# Patient Record
Sex: FEMALE | Race: WHITE | Employment: UNEMPLOYED | URBAN - METROPOLITAN AREA
[De-identification: names, ages, dates, MRNs, and addresses within clinical notes are randomized per-mention and may not be internally consistent; named-entity substitution may affect disease eponyms.]

---

## 2022-07-23 ENCOUNTER — HOSPITAL ENCOUNTER (EMERGENCY)
Age: 67
Discharge: PSYCHIATRIC OR SUBSTANCE USE FACILITY | DRG: 885 | End: 2022-07-24
Attending: EMERGENCY MEDICINE | Admitting: PSYCHIATRY & NEUROLOGY
Payer: MEDICARE

## 2022-07-23 DIAGNOSIS — F10.10 ALCOHOL ABUSE: ICD-10-CM

## 2022-07-23 DIAGNOSIS — T50.904A DRUG OVERDOSE OF UNDETERMINED INTENT, INITIAL ENCOUNTER: Primary | ICD-10-CM

## 2022-07-23 LAB
ALBUMIN SERPL-MCNC: 3.3 G/DL (ref 3.5–5)
ALBUMIN/GLOB SERPL: 0.7 {RATIO} (ref 1.1–2.2)
ALP SERPL-CCNC: 112 U/L (ref 45–117)
ALT SERPL-CCNC: 15 U/L (ref 12–78)
ANION GAP SERPL CALC-SCNC: 6 MMOL/L (ref 5–15)
APAP SERPL-MCNC: 6 UG/ML (ref 10–30)
AST SERPL-CCNC: 19 U/L (ref 15–37)
BASOPHILS # BLD: 0 K/UL (ref 0–0.1)
BASOPHILS NFR BLD: 0 % (ref 0–1)
BILIRUB SERPL-MCNC: 0.3 MG/DL (ref 0.2–1)
BUN SERPL-MCNC: 9 MG/DL (ref 6–20)
BUN/CREAT SERPL: 13 (ref 12–20)
CALCIUM SERPL-MCNC: 9.4 MG/DL (ref 8.5–10.1)
CHLORIDE SERPL-SCNC: 101 MMOL/L (ref 97–108)
CO2 SERPL-SCNC: 24 MMOL/L (ref 21–32)
CREAT SERPL-MCNC: 0.69 MG/DL (ref 0.55–1.02)
DIFFERENTIAL METHOD BLD: ABNORMAL
EOSINOPHIL # BLD: 0 K/UL (ref 0–0.4)
EOSINOPHIL NFR BLD: 0 % (ref 0–7)
ERYTHROCYTE [DISTWIDTH] IN BLOOD BY AUTOMATED COUNT: 15.6 % (ref 11.5–14.5)
ETHANOL SERPL-MCNC: <10 MG/DL
GLOBULIN SER CALC-MCNC: 4.6 G/DL (ref 2–4)
GLUCOSE SERPL-MCNC: 123 MG/DL (ref 65–100)
HCT VFR BLD AUTO: 32.3 % (ref 35–47)
HGB BLD-MCNC: 10.8 G/DL (ref 11.5–16)
IMM GRANULOCYTES # BLD AUTO: 0 K/UL (ref 0–0.04)
IMM GRANULOCYTES NFR BLD AUTO: 1 % (ref 0–0.5)
INR PPP: 1 (ref 0.9–1.1)
LYMPHOCYTES # BLD: 1 K/UL (ref 0.8–3.5)
LYMPHOCYTES NFR BLD: 15 % (ref 12–49)
MAGNESIUM SERPL-MCNC: 1.8 MG/DL (ref 1.6–2.4)
MCH RBC QN AUTO: 30.2 PG (ref 26–34)
MCHC RBC AUTO-ENTMCNC: 33.4 G/DL (ref 30–36.5)
MCV RBC AUTO: 90.2 FL (ref 80–99)
MONOCYTES # BLD: 1 K/UL (ref 0–1)
MONOCYTES NFR BLD: 15 % (ref 5–13)
NEUTS SEG # BLD: 4.9 K/UL (ref 1.8–8)
NEUTS SEG NFR BLD: 69 % (ref 32–75)
NRBC # BLD: 0 K/UL (ref 0–0.01)
NRBC BLD-RTO: 0 PER 100 WBC
PLATELET # BLD AUTO: 299 K/UL (ref 150–400)
PMV BLD AUTO: 9.6 FL (ref 8.9–12.9)
POTASSIUM SERPL-SCNC: 3.8 MMOL/L (ref 3.5–5.1)
PROT SERPL-MCNC: 7.9 G/DL (ref 6.4–8.2)
PROTHROMBIN TIME: 10.4 SEC (ref 9–11.1)
RBC # BLD AUTO: 3.58 M/UL (ref 3.8–5.2)
SALICYLATES SERPL-MCNC: <1.7 MG/DL (ref 2.8–20)
SODIUM SERPL-SCNC: 131 MMOL/L (ref 136–145)
WBC # BLD AUTO: 7 K/UL (ref 3.6–11)

## 2022-07-23 PROCEDURE — 80179 DRUG ASSAY SALICYLATE: CPT

## 2022-07-23 PROCEDURE — 96374 THER/PROPH/DIAG INJ IV PUSH: CPT

## 2022-07-23 PROCEDURE — 85610 PROTHROMBIN TIME: CPT

## 2022-07-23 PROCEDURE — 93005 ELECTROCARDIOGRAM TRACING: CPT

## 2022-07-23 PROCEDURE — 85025 COMPLETE CBC W/AUTO DIFF WBC: CPT

## 2022-07-23 PROCEDURE — 83735 ASSAY OF MAGNESIUM: CPT

## 2022-07-23 PROCEDURE — 80053 COMPREHEN METABOLIC PANEL: CPT

## 2022-07-23 PROCEDURE — 99285 EMERGENCY DEPT VISIT HI MDM: CPT

## 2022-07-23 PROCEDURE — 80143 DRUG ASSAY ACETAMINOPHEN: CPT

## 2022-07-23 PROCEDURE — 36415 COLL VENOUS BLD VENIPUNCTURE: CPT

## 2022-07-23 PROCEDURE — 74011250637 HC RX REV CODE- 250/637: Performed by: EMERGENCY MEDICINE

## 2022-07-23 PROCEDURE — 96361 HYDRATE IV INFUSION ADD-ON: CPT

## 2022-07-23 PROCEDURE — 82077 ASSAY SPEC XCP UR&BREATH IA: CPT

## 2022-07-23 RX ORDER — ACETAMINOPHEN 325 MG/1
650 TABLET ORAL
Status: COMPLETED | OUTPATIENT
Start: 2022-07-23 | End: 2022-07-23

## 2022-07-23 RX ADMIN — ACETAMINOPHEN 650 MG: 325 TABLET ORAL at 21:31

## 2022-07-24 ENCOUNTER — HOSPITAL ENCOUNTER (INPATIENT)
Age: 67
LOS: 2 days | Discharge: HOME | DRG: 881 | End: 2022-07-26
Attending: PSYCHIATRY & NEUROLOGY | Admitting: PSYCHIATRY & NEUROLOGY
Payer: MEDICARE

## 2022-07-24 VITALS
SYSTOLIC BLOOD PRESSURE: 134 MMHG | TEMPERATURE: 98 F | RESPIRATION RATE: 15 BRPM | BODY MASS INDEX: 18.78 KG/M2 | DIASTOLIC BLOOD PRESSURE: 86 MMHG | HEART RATE: 86 BPM | HEIGHT: 63 IN | WEIGHT: 106 LBS | OXYGEN SATURATION: 94 %

## 2022-07-24 DIAGNOSIS — F43.24 ADJUSTMENT DISORDER WITH DISTURBANCE OF CONDUCT: Primary | ICD-10-CM

## 2022-07-24 PROBLEM — F39 UNSPECIFIED MOOD (AFFECTIVE) DISORDER (HCC): Status: ACTIVE | Noted: 2022-07-24

## 2022-07-24 LAB
AMPHET UR QL SCN: NEGATIVE
ATRIAL RATE: 85 BPM
BARBITURATES UR QL SCN: POSITIVE
BENZODIAZ UR QL: POSITIVE
CALCULATED P AXIS, ECG09: 73 DEGREES
CALCULATED R AXIS, ECG10: 55 DEGREES
CALCULATED T AXIS, ECG11: 66 DEGREES
CANNABINOIDS UR QL SCN: POSITIVE
COCAINE UR QL SCN: NEGATIVE
DIAGNOSIS, 93000: NORMAL
DRUG SCRN COMMENT,DRGCM: ABNORMAL
FLUAV RNA SPEC QL NAA+PROBE: NOT DETECTED
FLUBV RNA SPEC QL NAA+PROBE: NOT DETECTED
METHADONE UR QL: NEGATIVE
OPIATES UR QL: NEGATIVE
P-R INTERVAL, ECG05: 150 MS
PCP UR QL: NEGATIVE
Q-T INTERVAL, ECG07: 378 MS
QRS DURATION, ECG06: 82 MS
QTC CALCULATION (BEZET), ECG08: 449 MS
SARS-COV-2, COV2: NOT DETECTED
UR CULT HOLD, URHOLD: NORMAL
VENTRICULAR RATE, ECG03: 85 BPM

## 2022-07-24 PROCEDURE — 74011250637 HC RX REV CODE- 250/637: Performed by: NURSE PRACTITIONER

## 2022-07-24 PROCEDURE — 74011250636 HC RX REV CODE- 250/636: Performed by: EMERGENCY MEDICINE

## 2022-07-24 PROCEDURE — 87636 SARSCOV2 & INF A&B AMP PRB: CPT

## 2022-07-24 PROCEDURE — 65220000003 HC RM SEMIPRIVATE PSYCH

## 2022-07-24 PROCEDURE — 65270000029 HC RM PRIVATE

## 2022-07-24 PROCEDURE — 80307 DRUG TEST PRSMV CHEM ANLYZR: CPT

## 2022-07-24 PROCEDURE — 74011250637 HC RX REV CODE- 250/637: Performed by: EMERGENCY MEDICINE

## 2022-07-24 RX ORDER — LIDOCAINE 50 MG/G
1 PATCH TOPICAL
COMMUNITY
Start: 2022-03-08

## 2022-07-24 RX ORDER — SODIUM BICARBONATE 650 MG/1
2 TABLET ORAL 2 TIMES DAILY
Status: ON HOLD | COMMUNITY
Start: 2022-04-27 | End: 2022-07-26 | Stop reason: SDUPTHER

## 2022-07-24 RX ORDER — BENZTROPINE MESYLATE 1 MG/1
0.5 TABLET ORAL
Status: DISCONTINUED | OUTPATIENT
Start: 2022-07-24 | End: 2022-07-26 | Stop reason: HOSPADM

## 2022-07-24 RX ORDER — ONDANSETRON 4 MG/1
4 TABLET, ORALLY DISINTEGRATING ORAL
Status: DISCONTINUED | OUTPATIENT
Start: 2022-07-24 | End: 2022-07-26 | Stop reason: HOSPADM

## 2022-07-24 RX ORDER — ONDANSETRON 2 MG/ML
4 INJECTION INTRAMUSCULAR; INTRAVENOUS ONCE
Status: COMPLETED | OUTPATIENT
Start: 2022-07-24 | End: 2022-07-24

## 2022-07-24 RX ORDER — HALOPERIDOL 5 MG/ML
2.5 INJECTION INTRAMUSCULAR
Status: DISCONTINUED | OUTPATIENT
Start: 2022-07-24 | End: 2022-07-26 | Stop reason: HOSPADM

## 2022-07-24 RX ORDER — UMECLIDINIUM BROMIDE AND VILANTEROL TRIFENATATE 62.5; 25 UG/1; UG/1
1 POWDER RESPIRATORY (INHALATION) DAILY
COMMUNITY

## 2022-07-24 RX ORDER — ADHESIVE BANDAGE
30 BANDAGE TOPICAL DAILY PRN
Status: DISCONTINUED | OUTPATIENT
Start: 2022-07-24 | End: 2022-07-26 | Stop reason: HOSPADM

## 2022-07-24 RX ORDER — CLOPIDOGREL BISULFATE 75 MG/1
75 TABLET ORAL DAILY
Status: ON HOLD | COMMUNITY
End: 2022-07-24 | Stop reason: SINTOL

## 2022-07-24 RX ORDER — ALBUTEROL SULFATE 90 UG/1
2 AEROSOL, METERED RESPIRATORY (INHALATION)
COMMUNITY

## 2022-07-24 RX ORDER — ATORVASTATIN CALCIUM 20 MG/1
20 TABLET, FILM COATED ORAL DAILY
Status: ON HOLD | COMMUNITY
End: 2022-07-26 | Stop reason: SDUPTHER

## 2022-07-24 RX ORDER — VALSARTAN 160 MG/1
1 TABLET ORAL DAILY
COMMUNITY
Start: 2022-07-23

## 2022-07-24 RX ORDER — ACETAMINOPHEN 325 MG/1
650 TABLET ORAL
Status: DISCONTINUED | OUTPATIENT
Start: 2022-07-24 | End: 2022-07-26 | Stop reason: HOSPADM

## 2022-07-24 RX ORDER — OLANZAPINE 2.5 MG/1
2.5 TABLET ORAL
Status: DISCONTINUED | OUTPATIENT
Start: 2022-07-24 | End: 2022-07-26 | Stop reason: HOSPADM

## 2022-07-24 RX ORDER — IBUPROFEN 200 MG
1 TABLET ORAL DAILY
Status: DISCONTINUED | OUTPATIENT
Start: 2022-07-25 | End: 2022-07-26 | Stop reason: HOSPADM

## 2022-07-24 RX ORDER — CARISOPRODOL 350 MG/1
1 TABLET ORAL
COMMUNITY
Start: 2022-05-16

## 2022-07-24 RX ORDER — DIPHENHYDRAMINE HYDROCHLORIDE 50 MG/ML
25 INJECTION, SOLUTION INTRAMUSCULAR; INTRAVENOUS
Status: DISCONTINUED | OUTPATIENT
Start: 2022-07-24 | End: 2022-07-26 | Stop reason: HOSPADM

## 2022-07-24 RX ORDER — ACETAMINOPHEN 325 MG/1
650 TABLET ORAL
Status: COMPLETED | OUTPATIENT
Start: 2022-07-24 | End: 2022-07-24

## 2022-07-24 RX ADMIN — ACETAMINOPHEN 650 MG: 325 TABLET ORAL at 15:06

## 2022-07-24 RX ADMIN — ACETAMINOPHEN 650 MG: 325 TABLET ORAL at 02:26

## 2022-07-24 RX ADMIN — ONDANSETRON 4 MG: 2 INJECTION INTRAMUSCULAR; INTRAVENOUS at 01:10

## 2022-07-24 RX ADMIN — ONDANSETRON 4 MG: 4 TABLET, ORALLY DISINTEGRATING ORAL at 15:37

## 2022-07-24 RX ADMIN — SODIUM CHLORIDE 1000 ML: 900 INJECTION, SOLUTION INTRAVENOUS at 01:28

## 2022-07-24 NOTE — PROGRESS NOTES
Pt is a new admission to the [facility], alert and oriented x4. Brought in on stretcher, ambulates with some unsteadiness. Calm and cooperative during admission process. States she's here post overdose on valsartan after being discharged from [facility] to go to the healing place. Currently denies SI/HI/AVH. Reports \"some\" depression. Reports previous alcohol use \"about two weeks ago\". Reports history of falls due to \"being drunk\". Endorses history of hypertension. States sleep and appetite are good. Lives in a one story apartment, alone, with sister as a support system. Denies any further needs at present. Skin assessment/change out performed by Briseida Maxwell and Nisreen Guerrero RN. Provided snack and shown to bedroom. Unit orientation provided. Orders received, placed on visual checks. Will continue to monitor .     Problem: Depressed Mood (Adult/Pediatric)  Goal: *STG: Remains safe in hospital  Outcome: Progressing Towards Goal

## 2022-07-24 NOTE — ED PROVIDER NOTES
56-year-old female presents from [facility] with reported overdose of valsartan. Patient was discharged earlier today from [facility] where she states she was being treated for alcohol withdrawal as well as crack cocaine withdrawal.  She filled the prescription for valsartan today and was dispensed 90 tablets. She states she swallowed approximately half the bottle. On account appears at 36 tablets were missing each 160 mg. Patient denies any vomiting. She is complaining of a headache and some abdominal pain. She has no lightheaded or dizziness. She cannot state why she swallowed the pills she states she is she is just in a fog and she did not know what she was doing. Past Medical History:   Diagnosis Date    Hx of transient ischemic attack (TIA)        No past surgical history on file. No family history on file. Social History     Socioeconomic History    Marital status: SINGLE     Spouse name: Not on file    Number of children: Not on file    Years of education: Not on file    Highest education level: Not on file   Occupational History    Not on file   Tobacco Use    Smoking status: Not on file    Smokeless tobacco: Not on file   Substance and Sexual Activity    Alcohol use: Not on file    Drug use: Not on file    Sexual activity: Not on file   Other Topics Concern    Not on file   Social History Narrative    Not on file     Social Determinants of Health     Financial Resource Strain: Not on file   Food Insecurity: Not on file   Transportation Needs: Not on file   Physical Activity: Not on file   Stress: Not on file   Social Connections: Not on file   Intimate Partner Violence: Not on file   Housing Stability: Not on file         ALLERGIES: Aspirin, Codeine, Fish containing products, Nsaids (non-steroidal anti-inflammatory drug), and Pcn [penicillins]    Review of Systems   Constitutional:  Negative for fever. HENT:  Negative for facial swelling.     Eyes:  Negative for visual disturbance. Respiratory:  Negative for chest tightness. Cardiovascular:  Negative for chest pain. Gastrointestinal:  Negative for abdominal pain. Genitourinary:  Negative for difficulty urinating and dysuria. Musculoskeletal:  Negative for arthralgias. Skin:  Negative for rash. Neurological:  Negative for headaches. Hematological:  Negative for adenopathy. Psychiatric/Behavioral:  Negative for suicidal ideas. Vitals:    07/24/22 0329 07/24/22 0429 07/24/22 0455 07/24/22 0830   BP: 138/72 138/76 116/76 134/86   Pulse: 87 82 84 86   Resp: 27 25 25 15   Temp:       SpO2: 94% 96% 95% 94%   Weight:       Height:                Physical Exam  Vitals and nursing note reviewed. Constitutional:       General: She is not in acute distress. Appearance: She is well-developed. HENT:      Head: Normocephalic and atraumatic. Eyes:      General: No scleral icterus. Conjunctiva/sclera: Conjunctivae normal.      Pupils: Pupils are equal, round, and reactive to light. Cardiovascular:      Rate and Rhythm: Normal rate. Heart sounds: No murmur heard. Pulmonary:      Effort: Pulmonary effort is normal. No respiratory distress. Abdominal:      General: There is no distension. Musculoskeletal:         General: Normal range of motion. Cervical back: Normal range of motion and neck supple. Skin:     General: Skin is warm and dry. Findings: No rash. Neurological:      Mental Status: She is alert and oriented to person, place, and time. MDM  Number of Diagnoses or Management Options  Alcohol abuse  Drug overdose of undetermined intent, initial encounter  Diagnosis management comments: Assessment: I spoke to poison control. They recommended 6 hours of observation during which we should be checking for hypotension as well as electrolyte disturbances.   If she does develop hypotension they recommended her to liter IV fluid bolus and if that did not help, starting vasopressors. If she remains relatively asymptomatic without serious lab abnormalities after 6 hours, she can be medically cleared. We will have Taylor Palmer evaluate her at that time. Amount and/or Complexity of Data Reviewed  Clinical lab tests: reviewed  Tests in the medicine section of CPT®: reviewed      ED Course as of 07/26/22 1918   Sat Jul 23, 2022 2019 EKG, 12 LEAD, INITIAL  ED EKG interpretation:  Rhythm: normal sinus rhythm. Rate (approx.): 85. Axis: normal.  ST segment:  No concerning ST elevations or depressions. This EKG was interpreted by Alethea Breaux MD,ED Provider. [JM]   8559 10:08 PM  Change of shift. Care of patient taken over from Dr. Freddie Bower; H&P reviewed, bedside handoff complete. Awaiting plan for observation until 0200 provided no complications then evaluate by [facility]  [UT]   Sun Jul 24, 2022   0109 Discussed case with BSMART. Patient is a candidate for psychiatric admission. Not medically clear. Will speak with poison control for further recs at 0200 [UT]      ED Course User Index  [JM] Sam Darnell MD  [UT] Aleida Mccoy MD     9:50 PM  Change of shift. Care of patient signed over to Dr. Joey Rod. Bedside handoff complete. Awaiting completion of observation period then [facility]es consult.      Procedures

## 2022-07-24 NOTE — ED NOTES
Patient placed in green gown, belongings secured outside of room. Security at bedside to wand patient. Ligature risks removed from room. Cardiac monitoring is still in place at this time d/t patient ingestion of medications PTA. Suicide precautions explained to patient who verbalized understanding. Charge RN notified of need for safety sitter.

## 2022-07-24 NOTE — BSMART NOTE
Patient accepted to [facility] to General Unit- Rm320 Bed 2; by NP, Alison Peña on the behalf of Dr. Wali Ray.     Nurse to Nurse- [phone]
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
## 2022-07-24 NOTE — BSMART NOTE
BSMART evaluation, rec inpatient due to patient reporting her recent ingestion of medication was an attempt to kill herself. Consulted with ED provider, Dr. Twan Fitzpatrick stating patient is not yet medically cleared and she will contact BSMART after medically clearance. Discussed patient's sodium level being low and stating Vicente, with bed access, was inquiring if patient's sodium level will be addressed. Dr. Hilda Duong said she will address patient's medical needs per poison control's recommendations. BSMART assessment completed, and suicide risk level noted to be high. Primary Nurse Samm Newell and Physician Dr. Twan Fitzpatrick notified. Concerns observed by Patient's belongings are in patient's room across the room from her at this time. Security/Off- has not been notified.

## 2022-07-24 NOTE — ED NOTES
Phone call from Poison control to review labs and VS.   Per PC, patient is \"cleared from toxicology standpoint\" but still needs to be evaluated by [facility] for intention of ingestion. BSMART notified by Ulis Carissa at this time.

## 2022-07-24 NOTE — PROGRESS NOTES
[facility] Admission Pharmacy Medication Reconciliation    Information obtained from:  Paola Wilkins [place] , patient interview  RxQuery data available1:yes    Comments/recommendations:    1) The patient was interviewed regarding current PTA medication list, use and drug allergies. Patient stated she hasn't taken atorvastatin or sodium bicarbonate for 2 months because she ran out 2 months ago because no refills left and needed to see doctor. Patient stated that clopidogrel was stopped by MD due to bruising. 2) Medication changes to PTA list:    Added  Valsartan 160 mg po daily (filled 7/23/22 #90)  Carisoprodol 350 mg po BID prn muscle spasms (filled 5/16/22 #60)  Albuterol inhaler  Removed  Clopidogrel 75 mg - patient stated MD stopped due to bruising  NOTES  Atorvastatin 20 mg po daily - ran out 2 months ago because no refills left and needed to see doctor  Sodium bicarbonate 650 mg po 2 tablets BID - ran out 2 months ago because no refills left and needed to see doctor    3) The [place] Prescription Monitoring Program () was accessed to determine fill history of any controlled medications:  Recurring monthly since 9/2020: carisoprodol 350 mg #60/30-day supply (last filled 5/16/22 #60)  Recurring monthly since 7/2021: gabapentin 100 mg - 400 mg #90/30-day supply (last filled 400 mg 5/5/2022 #90)  5/12/22 lorazepam 1 mg tab  #10/2-day supply  11/11/21 alprazolam 0.5 mg tab #10/5-day supply  4/12/22 & 2/11/21 chlordiazepoxide 25 mg #25/6-day supply  8/2020 to 11/2020 tramadol 50 mg #30/30-day supply       1RxQuery pharmacy benefit data reflects medications filled and processed through the patient's insurance, however                this data does NOT capture whether the medication was picked up or is currently being taken by the patient.      Time spent: 30 minutes    Past Medical History/Disease States:  Past Medical History:   Diagnosis Date    Hx of transient ischemic attack (TIA)          Patient allergies: Allergies as of 2022 - Fully Reviewed 2022   Allergen Reaction Noted    Aspirin Other (comments) 2022    Codeine Other (comments) 2022    Fish containing products Other (comments) 2022    Nsaids (non-steroidal anti-inflammatory drug) Other (comments) 2022    Pcn [penicillins] Other (comments) 2022         Prior to Admission Medications   Prescriptions Last Dose Informant  Taking? albuterol (PROVENTIL HFA, VENTOLIN HFA, PROAIR HFA) 90 mcg/actuation inhaler 2022   Yes   Sig: Take 2 Puffs by inhalation every four (4) hours as needed for Wheezing or Shortness of Breath. atorvastatin (LIPITOR) 20 mg tablet 2022   No   Sig: Take 20 mg by mouth in the morning. carisoprodoL (SOMA) 350 mg tablet 2022   Yes   Sig: Take 1 Tablet by mouth two (2) times daily as needed. lidocaine (LIDODERM) 5 %    Yes   Si Patch by TransDERmal route daily as needed. 12 hours on then 12 hours off   sodium bicarbonate 650 mg tablet 2022   No   Sig: Take 2 Tablets by mouth two (2) times a day. umeclidinium-vilanteroL (Anoro Ellipta) 62.5-25 mcg/actuation inhaler 2022   Yes   Sig: Take 1 Puff by inhalation daily. valsartan (DIOVAN) 160 mg tablet 2022   Yes   Sig: Take 1 Tablet by mouth in the morning.             Thank you,  Chandni Hall, [facility]

## 2022-07-24 NOTE — ED TRIAGE NOTES
TRIAGE NOTE:   Patient arrives by EMS from the [facility] with c/o overdosing on \"45\" Valsartan pills around 1700 today. Patient states she was not trying to harm herself, states \"I think its just the fog of alcohol withdrawal, I don't even remember taking them\". Patient just discharged from [facility] this morning for alcohol withdrawal.       Patient arrives with bottle of Valsartan 160 mg 90 tablets that was filled today. On count 36 out of 90 pills missing. Patient alert and oriented on arrival patient reports pain 7/10 to head and abdomen.

## 2022-07-25 PROBLEM — F32.A DEPRESSION: Status: ACTIVE | Noted: 2022-07-25

## 2022-07-25 PROBLEM — F43.24 ADJUSTMENT DISORDER WITH DISTURBANCE OF CONDUCT: Status: ACTIVE | Noted: 2022-07-25

## 2022-07-25 PROCEDURE — 99223 1ST HOSP IP/OBS HIGH 75: CPT | Performed by: PSYCHIATRY & NEUROLOGY

## 2022-07-25 PROCEDURE — 74011250637 HC RX REV CODE- 250/637: Performed by: NURSE PRACTITIONER

## 2022-07-25 PROCEDURE — 74011250637 HC RX REV CODE- 250/637

## 2022-07-25 PROCEDURE — 65220000003 HC RM SEMIPRIVATE PSYCH

## 2022-07-25 PROCEDURE — 74011250637 HC RX REV CODE- 250/637: Performed by: PSYCHIATRY & NEUROLOGY

## 2022-07-25 RX ORDER — TOPIRAMATE 25 MG/1
25 TABLET ORAL 2 TIMES DAILY WITH MEALS
Status: DISCONTINUED | OUTPATIENT
Start: 2022-07-25 | End: 2022-07-26 | Stop reason: HOSPADM

## 2022-07-25 RX ORDER — TIZANIDINE 4 MG/1
2 TABLET ORAL
Status: DISCONTINUED | OUTPATIENT
Start: 2022-07-25 | End: 2022-07-26 | Stop reason: HOSPADM

## 2022-07-25 RX ORDER — HYDROXYZINE 25 MG/1
25 TABLET, FILM COATED ORAL
Status: DISCONTINUED | OUTPATIENT
Start: 2022-07-25 | End: 2022-07-26 | Stop reason: HOSPADM

## 2022-07-25 RX ORDER — TOPIRAMATE 25 MG/1
25 TABLET ORAL 2 TIMES DAILY WITH MEALS
Status: DISCONTINUED | OUTPATIENT
Start: 2022-07-25 | End: 2022-07-25

## 2022-07-25 RX ORDER — ATORVASTATIN CALCIUM 10 MG/1
20 TABLET, FILM COATED ORAL DAILY
Status: DISCONTINUED | OUTPATIENT
Start: 2022-07-25 | End: 2022-07-26 | Stop reason: HOSPADM

## 2022-07-25 RX ORDER — VALSARTAN 40 MG/1
160 TABLET ORAL DAILY
Status: DISCONTINUED | OUTPATIENT
Start: 2022-07-25 | End: 2022-07-26 | Stop reason: HOSPADM

## 2022-07-25 RX ORDER — ALBUTEROL SULFATE 90 UG/1
2 AEROSOL, METERED RESPIRATORY (INHALATION)
Status: DISCONTINUED | OUTPATIENT
Start: 2022-07-25 | End: 2022-07-26 | Stop reason: HOSPADM

## 2022-07-25 RX ORDER — CARISOPRODOL 350 MG/1
350 TABLET ORAL
Status: DISCONTINUED | OUTPATIENT
Start: 2022-07-25 | End: 2022-07-25 | Stop reason: CLARIF

## 2022-07-25 RX ORDER — LANOLIN ALCOHOL/MO/W.PET/CERES
10.5 CREAM (GRAM) TOPICAL
Status: DISCONTINUED | OUTPATIENT
Start: 2022-07-25 | End: 2022-07-26 | Stop reason: HOSPADM

## 2022-07-25 RX ADMIN — ONDANSETRON 4 MG: 4 TABLET, ORALLY DISINTEGRATING ORAL at 08:27

## 2022-07-25 RX ADMIN — MELATONIN TAB 3 MG 10.5 MG: 3 TAB at 21:25

## 2022-07-25 RX ADMIN — HYDROXYZINE HYDROCHLORIDE 25 MG: 25 TABLET ORAL at 21:24

## 2022-07-25 RX ADMIN — ONDANSETRON 4 MG: 4 TABLET, ORALLY DISINTEGRATING ORAL at 13:03

## 2022-07-25 RX ADMIN — TOPIRAMATE 25 MG: 25 TABLET, FILM COATED ORAL at 10:18

## 2022-07-25 RX ADMIN — ATORVASTATIN CALCIUM 20 MG: 10 TABLET, FILM COATED ORAL at 10:17

## 2022-07-25 RX ADMIN — TOPIRAMATE 25 MG: 25 TABLET, FILM COATED ORAL at 17:28

## 2022-07-25 RX ADMIN — ONDANSETRON 4 MG: 4 TABLET, ORALLY DISINTEGRATING ORAL at 00:50

## 2022-07-25 RX ADMIN — ACETAMINOPHEN 650 MG: 325 TABLET ORAL at 17:28

## 2022-07-25 NOTE — PROGRESS NOTES
Spiritual Care Assessment/Progress Note  [facility]      NAME: Tresa Hightower      MRN: [id number]  AGE: 74 y.o. SEX: female  Adventism Affiliation: No preference   Language: English     7/25/2022     Total Time (in minutes): 5     Spiritual Assessment begun in [place] [id number] through conversation with:         []Patient        [] Family    [] Friend(s)        Reason for Consult: Initial/Spiritual assessment, patient floor     Spiritual beliefs: (Please include comment if needed)     [] Identifies with a ebony tradition:         [] Supported by a ebony community:            [] Claims no spiritual orientation:           [] Seeking spiritual identity:                [] Adheres to an individual form of spirituality:           [x] Not able to assess:                           Identified resources for coping:      [] Prayer                               [] Music                  [] Guided Imagery     [] Family/friends                 [] Pet visits     [] Devotional reading                         [x] Unknown     [] Other:                                               Interventions offered during this visit: (See comments for more details)                Plan of Care:     [] Support spiritual and/or cultural needs    [] Support AMD and/or advance care planning process      [] Support grieving process   [] Coordinate Rites and/or Rituals    [] Coordination with community clergy   [] No spiritual needs identified at this time   [] Detailed Plan of Care below (See Comments)  [] Make referral to Music Therapy  [] Make referral to Pet Therapy     [] Make referral to Addiction services  [] Make referral to [facility]  [] Make referral to Spiritual Care Partner  [] No future visits requested        [x] Contact Spiritual Care for further referrals     Comments:  visit for initial spiritual assessment. Patient out of room at the time of this visit.   Please contact spiritual care for further referral or consult. Rev.  Curt Robles MDiv, [facility], [facility]   paging service: [phone])

## 2022-07-25 NOTE — PROGRESS NOTES
Bedside and Verbal shift change report given to Cheng Lopez RN (oncoming nurse) by Steven Wooten RN (offgoing nurse). Report included the following information SBAR, Kardex, and MAR. Patient received in bed alert and oriented X 3, mood is flat and depressed, speech is clear, gait is unsteady. Patient denied SI, HI, AVH, pain, anxiety and depression. Patient reports nausea and excessive salivation. Emesis bag provided. As needed Zofran administered. 0600: Patient observed asleep for 8.5 hours with even respiration. Nursing rounds maintained. Problem: Falls - Risk of  Goal: *Absence of Falls  Description: Document Danne Lobe Fall Risk and appropriate interventions in the flowsheet.   Outcome: Progressing Towards Goal  Note: Fall Risk Interventions:  Mobility Interventions: Utilize walker, cane, or other assistive device       Problem: Anxiety-Behavioral Health (Adult/Pediatric)  Goal: *STG: Seeks staff when feelings of anxiety and fear arise  Outcome: Progressing Towards Goal

## 2022-07-25 NOTE — BH NOTES
Behavioral Health Interdisciplinary Rounds    Patient goal(s) for today: Attend groups, rest, complete ADL's, communicate needs to staff  Treatment team focus/goals: Discuss reason for admission  Progress note: Pt met with treatment team for the first time since admission. Pt reports that she was recently discharged from [facility] for detoxing from alcohol and [facility], which she did not want to attend. Pt got to [facility] and attempted to overdose on blood pressure medications. Pt reports she is feeling better and would like to be restarted on anxiety medications, pt is requesting Librium, MD to start on Topamax. Plan to discharge tomorrow to [facility] with follow up with PCP, possible referral to [address] [place] [address]. Financial concerns/prescription coverage: Medicare Part A&B [Pt Access to Confirm and report specific Medicare plan] and [facility] Medicaid [#[id number]] - Voluntary  Family contact:  Aparna Olivier, sister [phone]                   Family requesting physician contact today:  No  Discharge plan: TBD  Access to weapons : None                                                          Outpatient provider(s): TBD  Patient's preferred phone number for follow up call : [phone]  Patient's preferred e-mail address :    LOS:  1  Expected LOS: 2    Participating treatment team members: John Barrios MSW, Dr. Gaetano Leon

## 2022-07-25 NOTE — PROGRESS NOTES
[facility] Dosing Services: Therapeutic Interchange    Carisoprodol was therapeutically interchanged for tizanidine per the P&T Committee approved Therapeutic Interchanges Policy.     Thank you,     Michelle Ritchie, PharmD   Contact: [phone]

## 2022-07-25 NOTE — PROGRESS NOTES
Pt screened per LOS policy. Admitted to [facility], no acute nutrition issues identified. BMI c/w underweight for pt's age. Supplements ordered for pt's meal trays.   Ht: 5'3\"  Wt: 106 lb  BMI:  18.78 kg/(m^2) c/w underweight  Est energy needs: 1755 kcal, 62 g protein, 1 mL/kcal fluids  Pt will consume > 75% of meals at follow up 7-10 days  LOS, BMI

## 2022-07-25 NOTE — DISCHARGE INSTRUCTIONS
DISCHARGE SUMMARY    NAME:Tameka Wang  : 1955  MRN: [id number]    The patient Merissa Little exhibits the ability to control behavior in a less restrictive environment. Patient's level of functioning is improving. No assaultive/destructive behavior has been observed for the past 24 hours. No suicidal/homicidal threat or behavior has been observed for the past 24 hours. There is no evidence of serious medication side effects. Patient has not been in physical or protective restraints for at least the past 24 hours. If weapons involved, how are they secured? None    Is patient aware of and in agreement with discharge plan? Yes    Arrangements for medication:  Prescriptions sent to pharmacy    Copy of discharge instructions to provider?:  Yes    Arrangements for transportation home: Medicaid Cab scheduled 1330    Keep all follow up appointments as scheduled, continue to take prescribed medications per physician instructions.   Mental health crisis number:  262 or your local mental health crisis line number at [facility] at [phone] Detox  [phone] ()      Mental Health Emergency WARM LINE      2-693-522-MHAV ([phone])      M-F: 9am to 9pm      Sat & Sun: 5pm - 9pm  National suicide prevention lines:                             1-357-SPZOAMO (4-239-402-727-689-8958)       1-749-855-TALK (1-307-204-791.617.3784)    Crisis Text Line:  Text HOME to 267674

## 2022-07-25 NOTE — BH NOTES
PSYCHOSOCIAL ASSESSMENT  :Patient identifying info:   Tresa Hightower is a 74 y.o., female admitted 7/24/2022 10:17 AM     Presenting problem and precipitating factors: 74year old female admitted from [address] [place] [address] ED after an attempted suicide by overdosing on blood pressure medications. Pt was recently discharged from [address] [place] [address] for detox program and was sent to [facility] but she did not agree with this plan and felt trapped and attempted to overdose. Pt has hx of alcohol and cocaine use. Pt denies HI and [facility]. Mental status assessment: Pt is pleasant and forthcoming when meeting with treatment team. Pt appears slightly lethargic but is able to appropriately respond to assessment questions. Pt appears AOx4 and displays a clear, linear thought pattern. Strengths/Recreation/Coping Skills: Insured, stable housing, steady income    Collateral information: Bertin Doss, , [phone]; Ainsley Cooks, [phone]    Current psychiatric /substance abuse providers and contact info: Dr. Gavin Tse, Dr. Michelle Myers    Previous psychiatric/substance abuse providers and response to treatment: Hx of [facility] hospitalizations, discharged from [address] [place] [address] recently    Family history of mental illness or substance abuse: None stated    Substance abuse history:  Hx of alcohol and cocaine, UDS+ Barbiturates, Benzodiazepines, and THC, BAL 0  Social History     Tobacco Use    Smoking status: Not on file    Smokeless tobacco: Not on file   Substance Use Topics    Alcohol use: Not on file       History of biomedical complications associated with substance abuse: None stated    Patient's current acceptance of treatment or motivation for change: Voluntary admission    Family constellation: Pt is single without children    Is significant other involved?  No    Describe support system: Has  and support from sister    Describe living arrangements and home environment: Lives independently    GUARDIAN/POA: NO    Guardian Name: NO    Guardian Contact: NO    Health issues:   Hospital Problems  Never Reviewed            Codes Class Noted POA    Depression ICD-10-CM: F31. A  ICD-9-CM: 073  2022 Unknown           Trauma history: None stated    Legal issues: No pending legal charges    History of  service: No    Financial status: SSI    Yazidi/cultural factors: None stated    Education/work history: Achieved high school level of education    Have you been licensed as a health care professional (current or ): No    Describe coping skills: Limited, ineffective    TANJA Tolliver  2022

## 2022-07-25 NOTE — PROGRESS NOTES
Behavioral Services  Medicare Certification Upon Admission    I certify that this patient's inpatient psychiatric hospital admission is medically necessary for:    [x] (1) Treatment which could reasonably be expected to improve this patient's condition,       [x] (2) Or for diagnostic study;     AND     [x](2) The inpatient psychiatric services are provided while the individual is under the care of a physician and are included in the individualized plan of care.     Estimated length of stay/service 5-7 days    Plan for post-hospital care home    Electronically signed by Taisha Aviles MD on 7/25/2022 at 8:18 AM

## 2022-07-25 NOTE — PROGRESS NOTES
Patient received resting in her room. Denies SI/HI/AVH and depression, endorses an anxiety level of 7. Calm, cooperative, pleasant, isolative to her room throughout the shift. Taking medications as prescribed. Will continue to monitor for safety.

## 2022-07-25 NOTE — PROGRESS NOTES
Problem: Depressed Mood (Adult/Pediatric)  Goal: *STG: Participates in treatment plan  Outcome: Progressing Towards Goal  Goal: *STG: Remains safe in hospital  Outcome: Progressing Towards Goal  Goal: *STG: Complies with medication therapy  Outcome: Progressing Towards Goal     Problem: Anxiety-Behavioral Health (Adult/Pediatric)  Goal: *STG: Seeks staff when feelings of anxiety and fear arise  Outcome: Progressing Towards Goal     Problem: Falls - Risk of  Goal: *Absence of Falls  Description: Document Ernesto Fall Risk and appropriate interventions in the flowsheet.   Outcome: Progressing Towards Goal  Note: Fall Risk Interventions:    Mobility Interventions: Utilize walker, cane, or other assistive device

## 2022-07-26 VITALS
BODY MASS INDEX: 18.78 KG/M2 | RESPIRATION RATE: 16 BRPM | HEART RATE: 97 BPM | DIASTOLIC BLOOD PRESSURE: 69 MMHG | WEIGHT: 106 LBS | HEIGHT: 63 IN | SYSTOLIC BLOOD PRESSURE: 116 MMHG | TEMPERATURE: 98.2 F | OXYGEN SATURATION: 100 %

## 2022-07-26 PROCEDURE — 74011250637 HC RX REV CODE- 250/637: Performed by: PSYCHIATRY & NEUROLOGY

## 2022-07-26 PROCEDURE — 99239 HOSP IP/OBS DSCHRG MGMT >30: CPT | Performed by: PSYCHIATRY & NEUROLOGY

## 2022-07-26 PROCEDURE — 74011250637 HC RX REV CODE- 250/637: Performed by: NURSE PRACTITIONER

## 2022-07-26 RX ORDER — ONDANSETRON 4 MG/1
4 TABLET, ORALLY DISINTEGRATING ORAL
Qty: 60 TABLET | Refills: 1 | Status: SHIPPED | OUTPATIENT
Start: 2022-07-26

## 2022-07-26 RX ORDER — ATORVASTATIN CALCIUM 20 MG/1
20 TABLET, FILM COATED ORAL DAILY
Qty: 30 TABLET | Refills: 1 | Status: SHIPPED | OUTPATIENT
Start: 2022-07-26

## 2022-07-26 RX ORDER — ACETAMINOPHEN, DIPHENHYDRAMINE HCL, PHENYLEPHRINE HCL 325; 25; 5 MG/1; MG/1; MG/1
10.5 TABLET ORAL
Qty: 30 TABLET | Refills: 1 | Status: SHIPPED | OUTPATIENT
Start: 2022-07-26

## 2022-07-26 RX ORDER — SODIUM BICARBONATE 650 MG/1
1300 TABLET ORAL 2 TIMES DAILY
Qty: 120 TABLET | Refills: 1 | Status: SHIPPED | OUTPATIENT
Start: 2022-07-26

## 2022-07-26 RX ORDER — HYDROXYZINE 25 MG/1
25 TABLET, FILM COATED ORAL
Qty: 60 TABLET | Refills: 1 | Status: SHIPPED | OUTPATIENT
Start: 2022-07-26 | End: 2022-09-24

## 2022-07-26 RX ORDER — TOPIRAMATE 25 MG/1
25 TABLET ORAL 2 TIMES DAILY WITH MEALS
Qty: 60 TABLET | Refills: 1 | Status: SHIPPED | OUTPATIENT
Start: 2022-07-26

## 2022-07-26 RX ORDER — TIZANIDINE 4 MG/1
4 TABLET ORAL
Qty: 60 TABLET | Refills: 1 | Status: CANCELLED | OUTPATIENT
Start: 2022-07-26

## 2022-07-26 RX ADMIN — ONDANSETRON 4 MG: 4 TABLET, ORALLY DISINTEGRATING ORAL at 07:46

## 2022-07-26 RX ADMIN — ATORVASTATIN CALCIUM 20 MG: 10 TABLET, FILM COATED ORAL at 09:08

## 2022-07-26 RX ADMIN — TOPIRAMATE 25 MG: 25 TABLET, FILM COATED ORAL at 09:08

## 2022-07-26 NOTE — PROGRESS NOTES
Pharmacist Discharge Medication Reconciliation    Discharge Provider:  Nasrin 3      Discharge Medications:      My Medications        START taking these medications        Instructions Each Dose to Equal Morning Noon Evening Bedtime   hydrOXYzine HCL 25 mg tablet  Commonly known as: ATARAX    Your last dose was: Your next dose is: Take 1 Tablet by mouth two (2) times daily as needed for Anxiety for up to 60 days. Indications: anxious   25 mg                 melatonin 10 mg Tab    Your last dose was: Your next dose is: Take 10.5 mg by mouth nightly as needed for Insomnia. Indications: Insomnia   10.5 mg                 ondansetron 4 mg disintegrating tablet  Commonly known as: ZOFRAN ODT    Your last dose was: Your next dose is: Take 1 Tablet by mouth two (2) times daily as needed for Nausea or Vomiting. Indications: Nausea or vomiting   4 mg                 topiramate 25 mg tablet  Commonly known as: TOPAMAX    Your last dose was: Your next dose is: Take 1 Tablet by mouth two (2) times daily (with meals). Indications: cocaine abuse   25 mg                        CONTINUE taking these medications        Instructions Each Dose to Equal Morning Noon Evening Bedtime   albuterol 90 mcg/actuation inhaler  Commonly known as: PROVENTIL HFA, VENTOLIN HFA, PROAIR HFA    Your last dose was: Your next dose is: Take 2 Puffs by inhalation every four (4) hours as needed for Wheezing or Shortness of Breath. 2 Puff                 Anoro Ellipta 62.5-25 mcg/actuation inhaler  Generic drug: umeclidinium-vilanteroL    Your last dose was: Your next dose is: Take 1 Puff by inhalation daily. 1 Puff                 atorvastatin 20 mg tablet  Commonly known as: LIPITOR    Your last dose was: Your next dose is: Take 1 Tablet by mouth in the morning.  Indications: high amount of triglyceride in the blood   20 mg                 carisoprodoL 350 mg tablet  Commonly known as: SOMA    Your last dose was: Your next dose is: Take 1 Tablet by mouth two (2) times daily as needed. 1 Tablet                 lidocaine 5 %  Commonly known as: LIDODERM    Your last dose was: Your next dose is:         1 Patch by TransDERmal route daily as needed. 12 hours on then 12 hours off   1 Patch                 sodium bicarbonate 650 mg tablet    Your last dose was: Your next dose is: Take 2 Tablets by mouth two (2) times a day. Indications: Hyponatremia   1,300 mg                 valsartan 160 mg tablet  Commonly known as: DIOVAN    Your last dose was: Your next dose is: Take 1 Tablet by mouth in the morning.    1 Tablet                        STOP taking these medications      clopidogreL 75 mg Tab  Commonly known as: PLAVIX                  Where to Get Your Medications        These medications were sent to [facility], [address]  [address], [address] [place] [address]      Phone: [phone]   atorvastatin 20 mg tablet  hydrOXYzine HCL 25 mg tablet  melatonin 10 mg Tab  ondansetron 4 mg disintegrating tablet  sodium bicarbonate 650 mg tablet  topiramate 25 mg tablet       The patient's chart, MAR, and AVS were reviewed by   DAYRON Valencia,   Contact: [phone]

## 2022-07-26 NOTE — BH NOTES
Assumed care of the patient. Patient was mostly isolative to her room and appeared somewhat anxious. She was cooperative with the assessments. She denied S.I/H. I/A/V/H and confirmed little anxiety and depression. She said, anxiety is related to \"Going home tomorrow. \" PRN Trazodone for sleep and Atarax for anxiety administered upon request. Will continue to monitor and provide support as needed. Patient appeared to be sleeping for about 8 hours.

## 2022-07-26 NOTE — H&P
INITIAL PSYCHIATRIC EVALUATION            IDENTIFICATION:    Patient Name  Virgil Sandoval   Date of Birth 1955   [facility] [id number]   Medical Record Number  [id number]      Age  74 y.o. PCP None   Admit date:  7/24/2022    Room Number  321/01  @ [facility]   Date of Service  7/25/2022            HISTORY         REASON FOR HOSPITALIZATION:  CC: \"suicide attempt\". Pt admitted under a voluntary basis for suicidal ideations proving to be an imminent danger to self and an inability to care for self. HISTORY OF PRESENT ILLNESS:    The patient, Virgil Sandoval, is a 74 y.o. WHITE/NON- female with a past psychiatric history significant for cocaine use disorder, who presents at this time with complaints of (and/or evidence of) the following emotional symptoms: suicidal thoughts/threats and suicide attempt Overdose of: HTN medication (Valsartan) approximately 2 days ago. Additional symptomatology include increased mood lability. The above symptoms have been present for 48+ hours. These symptoms are of moderate to high severity. These symptoms are intermittent/ fleeting in nature. The patient's condition has been precipitated by psychosocial stressors. Patient's condition made worse by treatment noncompliance. UDS: +benzodiazepines, THC, barbiturates; BAL=0. The patient was send to ED following intentional overdose of her HTN medication because she did not want to attend rehab (she had just been detoxed off cocaine at an OSH). The patient is a fair historian. The patient corroborates the above narrative. The patient contracts for safety on the unit and gives consent for the team to contact collateral. The patient is amenable to initiating treatment while on the unit. The patient is agreeable to start and agent to address cocaine cravings, she does not wish to attend rehab and would prefer returning home. She denies active thoughts of self harm and is otherwise in fair behavioral control. ALLERGIES:   Allergies   Allergen Reactions    Aspirin Other (comments)    Codeine Other (comments)    Fish Containing Products Other (comments)    Nsaids (Non-Steroidal Anti-Inflammatory Drug) Other (comments)    Pcn [Penicillins] Other (comments)      MEDICATIONS PRIOR TO ADMISSION:   Medications Prior to Admission   Medication Sig    valsartan (DIOVAN) 160 mg tablet Take 1 Tablet by mouth in the morning. umeclidinium-vilanteroL (Anoro Ellipta) 62.5-25 mcg/actuation inhaler Take 1 Puff by inhalation daily. carisoprodoL (SOMA) 350 mg tablet Take 1 Tablet by mouth two (2) times daily as needed. albuterol (PROVENTIL HFA, VENTOLIN HFA, PROAIR HFA) 90 mcg/actuation inhaler Take 2 Puffs by inhalation every four (4) hours as needed for Wheezing or Shortness of Breath.    lidocaine (LIDODERM) 5 % 1 Patch by TransDERmal route daily as needed. 12 hours on then 12 hours off    atorvastatin (LIPITOR) 20 mg tablet Take 20 mg by mouth in the morning.    sodium bicarbonate 650 mg tablet Take 2 Tablets by mouth two (2) times a day. PAST MEDICAL HISTORY:   Past Medical History:   Diagnosis Date    Hx of transient ischemic attack (TIA)    No past surgical history on file. SOCIAL HISTORY:  The patient is currently retired; the patient is not a smoker; the patient's marital status is single; the patient does not have children; the patient reports the highest level of education achieved is high school. She lives alone. FAMILY HISTORY: History reviewed, pertinent family history as below:   No family history on file. REVIEW OF SYSTEMS:   Pertinent items are noted in the History of Present Illness. All other Systems reviewed and are considered negative.            MENTAL STATUS EXAM & VITALS     MENTAL STATUS EXAM (MSE):    MSE FINDINGS ARE WITHIN NORMAL LIMITS (WNL) UNLESS OTHERWISE STATED BELOW. ( ALL OF THE BELOW CATEGORIES OF THE MSE HAVE BEEN REVIEWED (reviewed 7/25/2022) AND UPDATED AS DEEMED APPROPRIATE )  General Presentation older than stated age, cooperative and guarded   Orientation oriented to time, place and person   Vital Signs  See below (reviewed 7/25/2022); Vital Signs (BP, Pulse, & Temp) are within normal limits if not listed below. Gait and Station Stable/steady, no ataxia   Musculoskeletal System No extrapyramidal symptoms (EPS); no abnormal muscular movements or Tardive Dyskinesia (TD); muscle strength and tone are within normal limits   Language No aphasia or dysarthria   Speech:  normal volume and non-pressured   Thought Processes logical; normal rate of thoughts; fair abstract reasoning/computation   Thought Associations goal directed   Thought Content preoccupations   Suicidal Ideations contracts for safety   Homicidal Ideations none   Mood:  depressed   Affect:  constricted and mood-congruent   Memory recent  intact   Memory remote:  intact   Concentration/Attention:  intact   Fund of Knowledge average   Insight:  limited   Reliability fair   Judgment:  poor          VITALS:     No data found.   Wt Readings from Last 3 Encounters:   07/24/22 48.1 kg (106 lb)   07/24/22 48.1 kg (106 lb)     Temp Readings from Last 3 Encounters:   07/24/22 97.9 °F (36.6 °C)   07/24/22 98 °F (36.7 °C)     BP Readings from Last 3 Encounters:   07/24/22 (!) 153/94   07/24/22 134/86     Pulse Readings from Last 3 Encounters:   07/24/22 100   07/24/22 86            DATA     LABORATORY DATA:  Labs Reviewed - No data to display  Admission on 07/23/2022, Discharged on 07/24/2022   Component Date Value Ref Range Status    Ventricular Rate 07/23/2022 85  BPM Final    Atrial Rate 07/23/2022 85  BPM Final    P-R Interval 07/23/2022 150  ms Final    QRS Duration 07/23/2022 82  ms Final    Q-T Interval 07/23/2022 378  ms Final    QTC Calculation (Bezet) 07/23/2022 449  ms Final    Calculated P Axis 07/23/2022 73  degrees Final    Calculated R Axis 07/23/2022 55  degrees Final    Calculated T Axis 07/23/2022 66  degrees Final Diagnosis 07/23/2022    Final                    Value:Normal sinus rhythm  Normal ECG  No previous ECGs available  Confirmed by Alyson Villanueva ([id number]) on 7/24/2022 11:35:33 AM      WBC 07/23/2022 7.0  3.6 - 11.0 K/uL Final    RBC 07/23/2022 3.58 (A) 3.80 - 5.20 M/uL Final    HGB 07/23/2022 10.8 (A) 11.5 - 16.0 g/dL Final    HCT 07/23/2022 32.3 (A) 35.0 - 47.0 % Final    MCV 07/23/2022 90.2  80.0 - 99.0 FL Final    MCH 07/23/2022 30.2  26.0 - 34.0 PG Final    MCHC 07/23/2022 33.4  30.0 - 36.5 g/dL Final    RDW 07/23/2022 15.6 (A) 11.5 - 14.5 % Final    PLATELET 88/65/9409 820  150 - 400 K/uL Final    MPV 07/23/2022 9.6  8.9 - 12.9 FL Final    NRBC 07/23/2022 0.0  0  WBC Final    ABSOLUTE NRBC 07/23/2022 0.00  0.00 - 0.01 K/uL Final    NEUTROPHILS 07/23/2022 69  32 - 75 % Final    LYMPHOCYTES 07/23/2022 15  12 - 49 % Final    MONOCYTES 07/23/2022 15 (A) 5 - 13 % Final    EOSINOPHILS 07/23/2022 0  0 - 7 % Final    BASOPHILS 07/23/2022 0  0 - 1 % Final    IMMATURE GRANULOCYTES 07/23/2022 1 (A) 0.0 - 0.5 % Final    ABS. NEUTROPHILS 07/23/2022 4.9  1.8 - 8.0 K/UL Final    ABS. LYMPHOCYTES 07/23/2022 1.0  0.8 - 3.5 K/UL Final    ABS. MONOCYTES 07/23/2022 1.0  0.0 - 1.0 K/UL Final    ABS. EOSINOPHILS 07/23/2022 0.0  0.0 - 0.4 K/UL Final    ABS. BASOPHILS 07/23/2022 0.0  0.0 - 0.1 K/UL Final    ABS. IMM.  GRANS. 07/23/2022 0.0  0.00 - 0.04 K/UL Final    DF 07/23/2022 AUTOMATED    Final    INR 07/23/2022 1.0  0.9 - 1.1   Final    Prothrombin time 07/23/2022 10.4  9.0 - 11.1 sec Final    Sodium 07/23/2022 131 (A) 136 - 145 mmol/L Final    Potassium 07/23/2022 3.8  3.5 - 5.1 mmol/L Final    Chloride 07/23/2022 101  97 - 108 mmol/L Final    CO2 07/23/2022 24  21 - 32 mmol/L Final    Anion gap 07/23/2022 6  5 - 15 mmol/L Final    Glucose 07/23/2022 123 (A) 65 - 100 mg/dL Final    BUN 07/23/2022 9  6 - 20 MG/DL Final    Creatinine 07/23/2022 0.69  0.55 - 1.02 MG/DL Final    BUN/Creatinine ratio 07/23/2022 13  12 - 20   Final GFR est AA 07/23/2022 >60  >60 ml/min/1.73m2 Final    GFR est non-AA 07/23/2022 >60  >60 ml/min/1.73m2 Final    Calcium 07/23/2022 9.4  8.5 - 10.1 MG/DL Final    Bilirubin, total 07/23/2022 0.3  0.2 - 1.0 MG/DL Final    ALT (SGPT) 07/23/2022 15  12 - 78 U/L Final    AST (SGOT) 07/23/2022 19  15 - 37 U/L Final    Alk. phosphatase 07/23/2022 112  45 - 117 U/L Final    Protein, total 07/23/2022 7.9  6.4 - 8.2 g/dL Final    Albumin 07/23/2022 3.3 (A) 3.5 - 5.0 g/dL Final    Globulin 07/23/2022 4.6 (A) 2.0 - 4.0 g/dL Final    A-G Ratio 07/23/2022 0.7 (A) 1.1 - 2.2   Final    Magnesium 07/23/2022 1.8  1.6 - 2.4 mg/dL Final    Salicylate level 21/79/5395 <1.7 (A) 2.8 - 20.0 MG/DL Final    Acetaminophen level 07/23/2022 6 (A) 10 - 30 ug/mL Final    ALCOHOL(ETHYL),SERUM 07/23/2022 <10  <10 MG/DL Final    AMPHETAMINES 07/24/2022 Negative  NEG   Final    BARBITURATES 07/24/2022 Positive (A) NEG   Final    BENZODIAZEPINES 07/24/2022 Positive (A) NEG   Final    COCAINE 07/24/2022 Negative  NEG   Final    METHADONE 07/24/2022 Negative  NEG   Final    OPIATES 07/24/2022 Negative  NEG   Final    PCP(PHENCYCLIDINE) 07/24/2022 Negative  NEG   Final    THC (TH-CANNABINOL) 07/24/2022 Positive (A) NEG   Final    Drug screen comment 07/24/2022 (NOTE)   Final    Urine culture hold 07/24/2022 Urine on hold in Microbiology dept for 2 days. If unpreserved urine is submitted, it cannot be used for addtional testing after 24 hours, recollection will be required. Final    SARS-CoV-2 by PCR 07/24/2022 Not detected  NOTD   Final    Influenza A by PCR 07/24/2022 Not detected  NOTD   Final    Influenza B by PCR 07/24/2022 Not detected  NOTD   Final        RADIOLOGY REPORTS:  No results found for this or any previous visit. No results found.            MEDICATIONS       ALL MEDICATIONS  Current Facility-Administered Medications   Medication Dose Route Frequency    albuterol (PROVENTIL HFA, VENTOLIN HFA, PROAIR HFA) inhaler 2 Puff  2 Puff Inhalation Q4H PRN    atorvastatin (LIPITOR) tablet 20 mg  20 mg Oral DAILY    valsartan (DIOVAN) tablet 160 mg  160 mg Oral DAILY    tiotropium-olodateroL (STIOLTO RESPIMAT) 2.5-2.5 mcg/actuation inhaler 2 Puff  2 Puff Inhalation DAILY    topiramate (TOPAMAX) tablet 25 mg  25 mg Oral BID WITH MEALS    tiZANidine (ZANAFLEX) tablet 2 mg  2 mg Oral TID PRN    hydrOXYzine HCL (ATARAX) tablet 25 mg  25 mg Oral TID PRN    melatonin tablet 10.5 mg  10.5 mg Oral QHS PRN    OLANZapine (ZyPREXA) tablet 2.5 mg  2.5 mg Oral Q6H PRN    haloperidol lactate (HALDOL) injection 2.5 mg  2.5 mg IntraMUSCular Q6H PRN    benztropine (COGENTIN) tablet 0.5 mg  0.5 mg Oral BID PRN    diphenhydrAMINE (BENADRYL) injection 25 mg  25 mg IntraMUSCular BID PRN    acetaminophen (TYLENOL) tablet 650 mg  650 mg Oral Q4H PRN    magnesium hydroxide (MILK OF MAGNESIA) 400 mg/5 mL oral suspension 30 mL  30 mL Oral DAILY PRN    nicotine (NICODERM CQ) 21 mg/24 hr patch 1 Patch  1 Patch TransDERmal DAILY    ondansetron (ZOFRAN ODT) tablet 4 mg  4 mg Oral Q6H PRN      SCHEDULED MEDICATIONS  Current Facility-Administered Medications   Medication Dose Route Frequency    atorvastatin (LIPITOR) tablet 20 mg  20 mg Oral DAILY    valsartan (DIOVAN) tablet 160 mg  160 mg Oral DAILY    tiotropium-olodateroL (STIOLTO RESPIMAT) 2.5-2.5 mcg/actuation inhaler 2 Puff  2 Puff Inhalation DAILY    topiramate (TOPAMAX) tablet 25 mg  25 mg Oral BID WITH MEALS    nicotine (NICODERM CQ) 21 mg/24 hr patch 1 Patch  1 Patch TransDERmal DAILY                ASSESSMENT & PLAN        The patient, Merissa Little, is a 74 y.o.  female who presents at this time for treatment of the following diagnoses:  Patient Active Hospital Problem List:  Adjustment disorder with disturbance of conduct (7/25/2022)   Assessment: the patients presents following a suicidal gesture in the setting of psychosocial stressors (being asked to attend rehab by her family and treatment team).  She is pleasant and cooperative but acknowledges fairly poor coping skills and limited frustration tolerance. Patient is agreeable to start an agent to lower the likelihood of cocaine relapse. She does not wish to attend rehab and is therefore a poor candidate. Plan:   - START Topamax 25 mg BIDAC for cocaine use disorder  - Observe off substances  - IGM therapy as tolerated  - Expand database / obtain collateral  - Dispo planning (home when stable)         A coordinated, multidisplinary treatment team (includes the nurse, unit pharmacist,  and writer) round was conducted for this initial evaluation with the patient present. The following regarding medications was addressed during rounds with patient: the risks and benefits of the proposed medication. The patient was given the opportunity to ask questions. Informed consent given to the use of the above medications. I will continue to adjust psychiatric and non-psychiatric medications (see above \"medication\" section and orders section for details) as deemed appropriate & based upon diagnoses and response to treatment. I have reviewed admission (and previous/old) labs and medical tests in the EHR and or transferring hospital documents. I will continue to order blood tests/labs and diagnostic tests as deemed appropriate and review results as they become available (see orders for details). I have reviewed old psychiatric and medical records available in the EHR. I Will order additional psychiatric records from other institutions to further elucidate the nature of patient's psychopathology and review once available. I will gather additional collateral information from friends, family and o/p treatment team to further elucidate the nature of patient's psychopathology and baselline level of psychiatric functioning.     I certify that this patient's inpatient psychiatric hospital services are required for treatment that could reasonably be expected to improve the patient's condition, or for diagnostic study, and that the patient continues to need, on a daily basis, active treatment furnished directly by or requiring the supervision of inpatient psychiatric facility personnel. In addition, the hospital records show that services furnished were intensive treatment services, admission or related services, or equivalent services.       ESTIMATED LENGTH OF STAY:  2-3 days       STRENGTHS:  Exercising self-direction/Resourceful, Access to housing/residential stability, and Awareness of Substance abuse issues                                        SIGNED:    Lissett Brown MD  7/25/2022

## 2022-07-26 NOTE — BH NOTES
Behavioral Health Interdisciplinary Rounds    Patient goal(s) for today: Attend groups, rest, complete ADL's, communicate needs to staff  Treatment team focus/goals: Discuss disposition planning  Progress note: Pt met with treatment team and appeared with fair mood and affect. Pt appeared more alert than previous day but reports she slept poorly last night due to discomfort from the mattress. Pt reports she is looking forward to going back to [facility],  provided resources for AA support groups in her area as well as registration information for local CSB. Pt demonstrates fair insight into supports needed to improve mental health and continue with sobriety. Pt denies SI/HI and WOODS AT [facility] and affirms plans to stay away from substance use triggers outside of the hospital. Pt reports that her sister is a major trigger and feels she needs distance. Pt to have transportation to [facility] with Medicaid cab at 1330. Financial concerns/prescription coverage: Medicare Part A&B [Pt Access to Confirm and report specific Medicare plan] and [facility] [#[id number]] - Voluntary  Family contact:  Jennifer Morel, sister [phone]                   Family requesting physician contact today:  No  Discharge plan: TBD  Access to weapons : None                                                          Outpatient provider(s): TBD  Patient's preferred phone number for follow up call : [phone]  Patient's preferred e-mail address :    LOS:  2  Expected LOS: 2    Participating treatment team members: Josiah Esqueda, Tamara Gomez, MSW, Dr. Hunter Bae

## 2022-07-26 NOTE — PROGRESS NOTES
Patient received resting in her room. Denies SI/HI/AVH, depression and anxiety. Calm, cooperative, isolative to her room throughout the shift. Taking medications as prescribed. Will continue to monitor for safety. 1410:  Patient walked down to meet a medicaid cab. All belongings returned. Discharge instructions reviewed with patient.

## 2022-07-26 NOTE — BH NOTES
Behavioral Health Transition Record to Provider    Patient Name: Mirella Alex  YOB: 1955  Medical Record Number: [id number]  Date of Admission: 7/24/2022  Date of Discharge: 7/26/22    Attending Provider: Carlos Waterman, *  Discharging Provider: Dr. Werner Lantigua  To contact this individual call [phone] and ask the  to page. If unavailable, ask to be transferred to [facility] Provider on call. [facility] Provider will be available on call 24/7 and during holidays. Primary Care Provider: None    Allergies   Allergen Reactions    Aspirin Other (comments)    Codeine Other (comments)    Fish Containing Products Other (comments)    Nsaids (Non-Steroidal Anti-Inflammatory Drug) Other (comments)    Pcn [Penicillins] Other (comments)       Reason for Admission:   REASON FOR HOSPITALIZATION:  CC: \"suicide attempt\". Pt admitted under a voluntary basis for suicidal ideations proving to be an imminent danger to self and an inability to care for self. HISTORY OF PRESENT ILLNESS:    The patient, Mirella Alex, is a 74 y.o. WHITE/NON- female with a past psychiatric history significant for cocaine use disorder, who presents at this time with complaints of (and/or evidence of) the following emotional symptoms: suicidal thoughts/threats and suicide attempt Overdose of: HTN medication (Valsartan) approximately 2 days ago. Additional symptomatology include increased mood lability. The above symptoms have been present for 48+ hours. These symptoms are of moderate to high severity. These symptoms are intermittent/ fleeting in nature. The patient's condition has been precipitated by psychosocial stressors. Patient's condition made worse by treatment noncompliance. UDS: +benzodiazepines, THC, barbiturates; BAL=0.      The patient was send to ED following intentional overdose of her HTN medication because she did not want to attend rehab (she had just been detoxed off cocaine at an OSH). The patient is a fair historian. The patient corroborates the above narrative. The patient contracts for safety on the unit and gives consent for the team to contact collateral. The patient is amenable to initiating treatment while on the unit. The patient is agreeable to start and agent to address cocaine cravings, she does not wish to attend rehab and would prefer returning home. She denies active thoughts of self harm and is otherwise in fair behavioral control. Admission Diagnosis: Depression [F32. A]    * No surgery found *    Results for orders placed or performed during the hospital encounter of 07/23/22   URINE CULTURE HOLD SAMPLE    Specimen: Serum   Result Value Ref Range    Urine culture hold        Urine on hold in Microbiology dept for 2 days. If unpreserved urine is submitted, it cannot be used for addtional testing after 24 hours, recollection will be required. COVID-19 WITH INFLUENZA A/B   Result Value Ref Range    SARS-CoV-2 by PCR Not detected NOTD      Influenza A by PCR Not detected NOTD      Influenza B by PCR Not detected NOTD     CBC WITH AUTOMATED DIFF   Result Value Ref Range    WBC 7.0 3.6 - 11.0 K/uL    RBC 3.58 (L) 3.80 - 5.20 M/uL    HGB 10.8 (L) 11.5 - 16.0 g/dL    HCT 32.3 (L) 35.0 - 47.0 %    MCV 90.2 80.0 - 99.0 FL    MCH 30.2 26.0 - 34.0 PG    MCHC 33.4 30.0 - 36.5 g/dL    RDW 15.6 (H) 11.5 - 14.5 %    PLATELET 046 269 - 434 K/uL    MPV 9.6 8.9 - 12.9 FL    NRBC 0.0 0  WBC    ABSOLUTE NRBC 0.00 0.00 - 0.01 K/uL    NEUTROPHILS 69 32 - 75 %    LYMPHOCYTES 15 12 - 49 %    MONOCYTES 15 (H) 5 - 13 %    EOSINOPHILS 0 0 - 7 %    BASOPHILS 0 0 - 1 %    IMMATURE GRANULOCYTES 1 (H) 0.0 - 0.5 %    ABS. NEUTROPHILS 4.9 1.8 - 8.0 K/UL    ABS. LYMPHOCYTES 1.0 0.8 - 3.5 K/UL    ABS. MONOCYTES 1.0 0.0 - 1.0 K/UL    ABS. EOSINOPHILS 0.0 0.0 - 0.4 K/UL    ABS. BASOPHILS 0.0 0.0 - 0.1 K/UL    ABS. IMM.  GRANS. 0.0 0.00 - 0.04 K/UL    DF AUTOMATED     PROTHROMBIN TIME + INR Result Value Ref Range    INR 1.0 0.9 - 1.1      Prothrombin time 10.4 9.0 - 21.7 sec   METABOLIC PANEL, COMPREHENSIVE   Result Value Ref Range    Sodium 131 (L) 136 - 145 mmol/L    Potassium 3.8 3.5 - 5.1 mmol/L    Chloride 101 97 - 108 mmol/L    CO2 24 21 - 32 mmol/L    Anion gap 6 5 - 15 mmol/L    Glucose 123 (H) 65 - 100 mg/dL    BUN 9 6 - 20 MG/DL    Creatinine 0.69 0.55 - 1.02 MG/DL    BUN/Creatinine ratio 13 12 - 20      GFR est AA >60 >60 ml/min/1.73m2    GFR est non-AA >60 >60 ml/min/1.73m2    Calcium 9.4 8.5 - 10.1 MG/DL    Bilirubin, total 0.3 0.2 - 1.0 MG/DL    ALT (SGPT) 15 12 - 78 U/L    AST (SGOT) 19 15 - 37 U/L    Alk.  phosphatase 112 45 - 117 U/L    Protein, total 7.9 6.4 - 8.2 g/dL    Albumin 3.3 (L) 3.5 - 5.0 g/dL    Globulin 4.6 (H) 2.0 - 4.0 g/dL    A-G Ratio 0.7 (L) 1.1 - 2.2     MAGNESIUM   Result Value Ref Range    Magnesium 1.8 1.6 - 2.4 mg/dL   SALICYLATE   Result Value Ref Range    Salicylate level <9.9 (L) 2.8 - 20.0 MG/DL   ACETAMINOPHEN   Result Value Ref Range    Acetaminophen level 6 (L) 10 - 30 ug/mL   ETHYL ALCOHOL   Result Value Ref Range    ALCOHOL(ETHYL),SERUM <10 <10 MG/DL   DRUG SCREEN, URINE   Result Value Ref Range    AMPHETAMINES Negative NEG      BARBITURATES Positive (A) NEG      BENZODIAZEPINES Positive (A) NEG      COCAINE Negative NEG      METHADONE Negative NEG      OPIATES Negative NEG      PCP(PHENCYCLIDINE) Negative NEG      THC (TH-CANNABINOL) Positive (A) NEG      Drug screen comment (NOTE)    EKG, 12 LEAD, INITIAL   Result Value Ref Range    Ventricular Rate 85 BPM    Atrial Rate 85 BPM    P-R Interval 150 ms    QRS Duration 82 ms    Q-T Interval 378 ms    QTC Calculation (Bezet) 449 ms    Calculated P Axis 73 degrees    Calculated R Axis 55 degrees    Calculated T Axis 66 degrees    Diagnosis       Normal sinus rhythm  Normal ECG  No previous ECGs available  Confirmed by Faina Roy ([id number]) on 7/24/2022 11:35:33 AM         Immunizations administered during this encounter:   Immunization History   Administered Date(s) Administered    COVID-19, J&J, (age 18y+), IM, 0.5mL 08/11/2021       Screening for Metabolic Disorders for Patients on Antipsychotic Medications  (Data obtained from the EMR)    Estimated Body Mass Index  Estimated body mass index is 18.78 kg/m² as calculated from the following:    Height as of this encounter: 5' 3\" (1.6 m). Weight as of this encounter: 48.1 kg (106 lb). Vital Signs/Blood Pressure  Visit Vitals  BP (!) 161/85   Pulse 98   Temp 98.4 °F (36.9 °C)   Resp 16   Ht 5' 3\" (1.6 m)   Wt 48.1 kg (106 lb)   SpO2 98%   BMI 18.78 kg/m²       Blood Glucose/Hemoglobin A1c  Lab Results   Component Value Date/Time    Glucose 123 (H) 07/23/2022 08:24 PM       No results found for: HBA1C, [id number]     Lipid Panel  No results found for: CHOL, CHOLX, CHLST, CHOLV, 021464, HDL, HDLP, LDL, LDLC, DLDLP, TGLX, TRIGL, TRIGP, CHHD, CHHDX     Discharge Diagnosis: Adjustment disorder with disturbance of conduct    Discharge Plan: The patient Yang Barney exhibits the ability to control behavior in a less restrictive environment. Patient's level of functioning is improving. No assaultive/destructive behavior has been observed for the past 24 hours. No suicidal/homicidal threat or behavior has been observed for the past 24 hours. There is no evidence of serious medication side effects. Patient has not been in physical or protective restraints for at least the past 24 hours. If weapons involved, how are they secured? None    Is patient aware of and in agreement with discharge plan? Yes    Arrangements for medication:  Prescriptions sent to pharmacy    Copy of discharge instructions to provider?:  Yes    Arrangements for transportation home: [employer] Cab scheduled 1330    Keep all follow up appointments as scheduled, continue to take prescribed medications per physician instructions.   Mental health crisis number:  [id number] or your local mental health crisis line number at [employer] at [phone] Detox - [phone] (24/7)    Discharge Medication List and Instructions:   Discharge Medication List as of 7/26/2022  1:08 PM        START taking these medications    Details   hydrOXYzine HCL (ATARAX) 25 mg tablet Take 1 Tablet by mouth two (2) times daily as needed for Anxiety for up to 60 days. Indications: anxious, Normal, Disp-60 Tablet, R-1      melatonin 10 mg tab Take 10.5 mg by mouth nightly as needed for Insomnia. Indications: Insomnia, Normal, Disp-30 Tablet, R-1      ondansetron (ZOFRAN ODT) 4 mg disintegrating tablet Take 1 Tablet by mouth two (2) times daily as needed for Nausea or Vomiting. Indications: Nausea or vomiting, Normal, Disp-60 Tablet, R-1      topiramate (TOPAMAX) 25 mg tablet Take 1 Tablet by mouth two (2) times daily (with meals). Indications: cocaine abuse, Normal, Disp-60 Tablet, R-1           CONTINUE these medications which have CHANGED    Details   atorvastatin (LIPITOR) 20 mg tablet Take 1 Tablet by mouth in the morning. Indications: high amount of triglyceride in the blood, Normal, Disp-30 Tablet, R-1      sodium bicarbonate 650 mg tablet Take 2 Tablets by mouth two (2) times a day. Indications: Hyponatremia, Normal, Disp-120 Tablet, R-1           CONTINUE these medications which have NOT CHANGED    Details   valsartan (DIOVAN) 160 mg tablet Take 1 Tablet by mouth in the morning., Historical Med      umeclidinium-vilanteroL (Anoro Ellipta) 62.5-25 mcg/actuation inhaler Take 1 Puff by inhalation daily. , Historical Med      carisoprodoL (SOMA) 350 mg tablet Take 1 Tablet by mouth two (2) times daily as needed., Historical Med      albuterol (PROVENTIL HFA, VENTOLIN HFA, PROAIR HFA) 90 mcg/actuation inhaler Take 2 Puffs by inhalation every four (4) hours as needed for Wheezing or Shortness of Breath., Historical Med      lidocaine (LIDODERM) 5 % 1 Patch by TransDERmal route daily as needed.  12 hours on then 12 hours off, Historical Med           STOP taking these medications       clopidogreL (PLAVIX) 75 mg tab Comments:   Reason for Stopping:               Unresulted Labs (24h ago, onward)      None          To obtain results of studies pending at discharge, please contact [phone]    Follow-up Information       Follow up With Specialties Details Why Contact Info    Dr. Dominic Sepulveda an appointment as soon as possible for a visit in 3 day(s) Call to schedule appointment as soon as possible (patient declined appointment arrangement through case management services), go to for medication management [address], [place] [address]  [phone]    Cheng Fatima  Call  to assist with sobriety and help manage appointments and transportation [phone] [address] in 2 day(s) Go to for therapy services, connection to services, and case management services [address]    P: [phone]   F:    None    None ([phone]) Patient stated that they have no PCP              Advanced Directive:   Does the patient have an appointed surrogate decision maker? No  Does the patient have a Medical Advance Directive? No  Does the patient have a Psychiatric Advance Directive? No  If the patient does not have a surrogate or Medical Advance Directive AND Psychiatric Advance Directive, the patient was offered information on these advance directives Yes and Patient declined to complete    Patient Instructions: Please continue all medications until otherwise directed by physician. Tobacco Cessation Discharge Plan:   Is the patient a smoker and needs referral for smoking cessation? No  Patient referred to the following for smoking cessation with an appointment? Not applicable     Patient was offered medication to assist with smoking cessation at discharge?  Not applicable  Was education for smoking cessation added to the discharge instructions? Yes    Alcohol/Substance Abuse Discharge Plan:   Does the patient have a history of substance/alcohol abuse and requires a referral for treatment? Yes  Patient referred to the following for substance/alcohol abuse treatment with an appointment? Refused  Patient was offered medication to assist with alcohol cessation at discharge? Refused  Was education for substance/alcohol abuse added to discharge instructions? Yes    Patient discharged to Home; discussed with patient/caregiver and provided to the patient/caregiver either in hard copy or electronically.

## 2022-07-26 NOTE — PROGRESS NOTES
Problem: Patient Education: Go to Patient Education Activity  Goal: Patient/Family Education  Outcome: Resolved/Met     Problem: Depressed Mood (Adult/Pediatric)  Goal: *STG: Participates in treatment plan  7/26/2022 1103 by Emerita Lopez RN  Outcome: Resolved/Met  7/26/2022 1100 by Emerita Lopez RN  Outcome: Progressing Towards Goal  Goal: *STG: Participates in 1:1 therapy sessions  Outcome: Resolved/Met  Goal: *STG: Verbalizes anger, guilt, and other feelings in a constructive manor  7/26/2022 1103 by Emerita Lopez RN  Outcome: Resolved/Met  7/26/2022 1100 by Emerita Lopez RN  Outcome: Progressing Towards Goal  Goal: *STG: Attends activities and groups  Outcome: Resolved/Met  Goal: *STG: Demonstrates reduction in symptoms and increase in insight into coping skills/future focused  7/26/2022 1103 by Emerita Lopez RN  Outcome: Resolved/Met  7/26/2022 1100 by Emerita Lopez RN  Outcome: Progressing Towards Goal  Goal: *STG: Remains safe in hospital  7/26/2022 1103 by Emerita Lopez RN  Outcome: Resolved/Met  7/26/2022 1100 by Emerita Lopez RN  Outcome: Progressing Towards Goal  Goal: *STG: Complies with medication therapy  7/26/2022 1103 by Emerita Lopez RN  Outcome: Resolved/Met  7/26/2022 1100 by Emerita Lopez RN  Outcome: Progressing Towards Goal  Goal: *LTG: Returns to previous level of functioning and participates with after care plan  Outcome: Resolved/Met  Goal: *LTG: Understands illness and can identify signs of relapse  Outcome: Resolved/Met  Goal: Interventions  Outcome: Resolved/Met     Problem: Patient Education: Go to Patient Education Activity  Goal: Patient/Family Education  Outcome: Resolved/Met     Problem: Discharge Planning  Goal: *Discharge to safe environment  Outcome: Resolved/Met  Goal: *Knowledge of medication management  Outcome: Resolved/Met  Goal: *Knowledge of discharge instructions  Outcome: Resolved/Met     Problem: Patient Education: Go to Patient Education Activity  Goal: Patient/Family Education  Outcome: Resolved/Met     Problem: Discharge Planning  Goal: *Discharge to safe environment  Outcome: Resolved/Met  Goal: *Knowledge of medication management  Outcome: Resolved/Met  Goal: *Knowledge of discharge instructions  Outcome: Resolved/Met     Problem: Anxiety-Behavioral Health (Adult/Pediatric)  Goal: *STG: Participates in treatment plan  Outcome: Resolved/Met  Goal: *STG: Remains safe in hospital  Outcome: Resolved/Met  Goal: *STG: Seeks staff when feelings of anxiety and fear arise  Outcome: Resolved/Met  Goal: *STG: Attends activities and groups  Outcome: Resolved/Met  Goal: *STG: Demonstrates decrease in ritualistic behavior  Outcome: Resolved/Met  Goal: *STG: Demonstrates effective anxiety reduction strategies  Outcome: Resolved/Met  Goal: *STG/LTG: Trigger identification  Outcome: Resolved/Met  Goal: *STG/LTG: Complies with medication therapy  Outcome: Resolved/Met  Goal: *LTG:  Verbalizes understanding of personal adaptive level of functioning  Outcome: Resolved/Met  Goal: Interventions  Outcome: Resolved/Met     Problem: Patient Education: Go to Patient Education Activity  Goal: Patient/Family Education  Outcome: Resolved/Met     Problem: Falls - Risk of  Goal: *Absence of Falls  Description: Document Ernesto Fall Risk and appropriate interventions in the flowsheet.   7/26/2022 1103 by Angela Mireles, RN  Outcome: Resolved/Met  Note: Fall Risk Interventions:  Mobility Interventions: Utilize walker, cane, or other assistive device                          7/26/2022 1100 by Angela Mireles, RN  Outcome: Progressing Towards Goal  Note: Fall Risk Interventions:

## 2022-07-26 NOTE — PROGRESS NOTES
Problem: Depressed Mood (Adult/Pediatric)  Goal: *STG: Participates in treatment plan  Outcome: Progressing Towards Goal  Goal: *STG: Verbalizes anger, guilt, and other feelings in a constructive manor  Outcome: Progressing Towards Goal  Goal: *STG: Demonstrates reduction in symptoms and increase in insight into coping skills/future focused  Outcome: Progressing Towards Goal  Goal: *STG: Remains safe in hospital  Outcome: Progressing Towards Goal  Goal: *STG: Complies with medication therapy  Outcome: Progressing Towards Goal     Problem: Falls - Risk of  Goal: *Absence of Falls  Description: Document Ernesto Fall Risk and appropriate interventions in the flowsheet.   Outcome: Progressing Towards Goal  Note: Fall Risk Interventions:

## 2022-07-27 NOTE — DISCHARGE SUMMARY
PSYCHIATRIC DISCHARGE SUMMARY         IDENTIFICATION:    Patient Name  Dyllan Qiu   Date of Birth 1955   [facility] [id number]   Medical Record Number  [id number]      Age  74 y.o. PCP None   Admit date:  7/24/2022    Discharge date: 7/26/2022   Room Number  321/01  @ [facility]   Date of Service  7/26/2022            TYPE OF DISCHARGE: REGULAR               CONDITION AT DISCHARGE: improved and fair       PROVISIONAL & DISCHARGE DIAGNOSES:    Problem List  Never Reviewed            Codes Class    Depression ICD-10-CM: F32. A  ICD-9-CM: 311         * (Principal) Adjustment disorder with disturbance of conduct ICD-10-CM: F43.24  ICD-9-CM: 309.3         Unspecified mood (affective) disorder (HCC) ICD-10-CM: F39  ICD-9-CM: 296.90            Active Hospital Problems    *Adjustment disorder with disturbance of conduct        DISCHARGE DIAGNOSIS:   Axis I:  SEE ABOVE  Axis II: SEE ABOVE  Axis III: SEE ABOVE  Axis IV:  lack of structure  Axis V:  30 on admission, 70 on discharge     CC & HISTORY OF PRESENT ILLNESS:  \"Suicide attempt\"    The patient, Dyllan Qiu, is a 74 y.o. WHITE/NON- female with a past psychiatric history significant for cocaine use disorder, who presents at this time with complaints of (and/or evidence of) the following emotional symptoms: suicidal thoughts/threats and suicide attempt Overdose of: HTN medication (Valsartan) approximately 2 days ago. Additional symptomatology include increased mood lability. The above symptoms have been present for 48+ hours. These symptoms are of moderate to high severity. These symptoms are intermittent/ fleeting in nature. The patient's condition has been precipitated by psychosocial stressors. Patient's condition made worse by treatment noncompliance. UDS: +benzodiazepines, THC, barbiturates; BAL=0.      The patient was send to ED following intentional overdose of her HTN medication because she did not want to attend rehab (she had just been detoxed off cocaine at an OSH). The patient is a fair historian. The patient corroborates the above narrative. The patient contracts for safety on the unit and gives consent for the team to contact collateral. The patient is amenable to initiating treatment while on the unit. The patient is agreeable to start and agent to address cocaine cravings, she does not wish to attend rehab and would prefer returning home. She denies active thoughts of self harm and is otherwise in fair behavioral control. SOCIAL HISTORY:    Social History     Socioeconomic History    Marital status: SINGLE     Spouse name: Not on file    Number of children: Not on file    Years of education: Not on file    Highest education level: Not on file   Occupational History    Not on file   Tobacco Use    Smoking status: Not on file    Smokeless tobacco: Not on file   Substance and Sexual Activity    Alcohol use: Not on file    Drug use: Not on file    Sexual activity: Not on file   Other Topics Concern    Not on file   Social History Narrative    Not on file     Social Determinants of Health     Financial Resource Strain: Not on file   Food Insecurity: Not on file   Transportation Needs: Not on file   Physical Activity: Not on file   Stress: Not on file   Social Connections: Not on file   Intimate Partner Violence: Not on file   Housing Stability: Not on file      FAMILY HISTORY:   No family history on file. HOSPITALIZATION COURSE:    Marzena Díaz was admitted to the inpatient psychiatric unit [facility] for acute psychiatric stabilization in regards to symptomatology as described in the HPI above. The differential diagnosis at time of admission included: MDD vs adjustment disorder. While on the unit Marzena Díaz was involved in individual, group, occupational and milieu therapy. Psychiatric medications were adjusted during this hospitalization including Topamax.    Marzena Díaz demonstrated a slow, but progressive improvement in overall condition. Much of patient's initial presentation appeared to be related to situational stressors, effects of medication non-compliance drugs of abuse, and psychological factors. Please see individual progress notes for more specific details regarding patient's hospitalization course. Patient with request for discharge today. There are no grounds to seek a TDO. At time of discharge, Jeremy Torres is without significant problems of depression, psychosis, or jerica. Patient free of suicidal and homicidal ideations (appears to be a chronic, elevated risk of suicide or homicide) and reports many positive predictive factors in terms of not attempting suicide or homicide. Overall presentation at time of discharge is most consistent with the diagnosis of adjustment disorder with depressed mood. Patient has maximized benefit to be derived from acute inpatient psychiatric treatment. All members of the treatment team concur with each other in regards to plans for discharge today. Patient and family are aware and in agreement with discharge and discharge plan.          LABS AND IMAGAING:    Labs Reviewed - No data to display  No results found for: DS35, PHEN, PHENO, PHENT, DILF, DS39, PHENY, PTN, VALF2, VALAC, VALP, VALPR, DS6, CRBAM, CRBAMP, CARB2, XCRBAM  Admission on 07/23/2022, Discharged on 07/24/2022   Component Date Value Ref Range Status    Ventricular Rate 07/23/2022 85  BPM Final    Atrial Rate 07/23/2022 85  BPM Final    P-R Interval 07/23/2022 150  ms Final    QRS Duration 07/23/2022 82  ms Final    Q-T Interval 07/23/2022 378  ms Final    QTC Calculation (Bezet) 07/23/2022 449  ms Final    Calculated P Axis 07/23/2022 73  degrees Final    Calculated R Axis 07/23/2022 55  degrees Final    Calculated T Axis 07/23/2022 66  degrees Final    Diagnosis 07/23/2022    Final                    Value:Normal sinus rhythm  Normal ECG  No previous ECGs available  Confirmed by Pringle Clamp, Taylor Murray ([id number]) on 7/24/2022 11:35:33 AM      WBC 07/23/2022 7.0  3.6 - 11.0 K/uL Final    RBC 07/23/2022 3.58 (A) 3.80 - 5.20 M/uL Final    HGB 07/23/2022 10.8 (A) 11.5 - 16.0 g/dL Final    HCT 07/23/2022 32.3 (A) 35.0 - 47.0 % Final    MCV 07/23/2022 90.2  80.0 - 99.0 FL Final    MCH 07/23/2022 30.2  26.0 - 34.0 PG Final    MCHC 07/23/2022 33.4  30.0 - 36.5 g/dL Final    RDW 07/23/2022 15.6 (A) 11.5 - 14.5 % Final    PLATELET 50/01/3618 587  150 - 400 K/uL Final    MPV 07/23/2022 9.6  8.9 - 12.9 FL Final    NRBC 07/23/2022 0.0  0  WBC Final    ABSOLUTE NRBC 07/23/2022 0.00  0.00 - 0.01 K/uL Final    NEUTROPHILS 07/23/2022 69  32 - 75 % Final    LYMPHOCYTES 07/23/2022 15  12 - 49 % Final    MONOCYTES 07/23/2022 15 (A) 5 - 13 % Final    EOSINOPHILS 07/23/2022 0  0 - 7 % Final    BASOPHILS 07/23/2022 0  0 - 1 % Final    IMMATURE GRANULOCYTES 07/23/2022 1 (A) 0.0 - 0.5 % Final    ABS. NEUTROPHILS 07/23/2022 4.9  1.8 - 8.0 K/UL Final    ABS. LYMPHOCYTES 07/23/2022 1.0  0.8 - 3.5 K/UL Final    ABS. MONOCYTES 07/23/2022 1.0  0.0 - 1.0 K/UL Final    ABS. EOSINOPHILS 07/23/2022 0.0  0.0 - 0.4 K/UL Final    ABS. BASOPHILS 07/23/2022 0.0  0.0 - 0.1 K/UL Final    ABS. IMM.  GRANS. 07/23/2022 0.0  0.00 - 0.04 K/UL Final    DF 07/23/2022 AUTOMATED    Final    INR 07/23/2022 1.0  0.9 - 1.1   Final    Prothrombin time 07/23/2022 10.4  9.0 - 11.1 sec Final    Sodium 07/23/2022 131 (A) 136 - 145 mmol/L Final    Potassium 07/23/2022 3.8  3.5 - 5.1 mmol/L Final    Chloride 07/23/2022 101  97 - 108 mmol/L Final    CO2 07/23/2022 24  21 - 32 mmol/L Final    Anion gap 07/23/2022 6  5 - 15 mmol/L Final    Glucose 07/23/2022 123 (A) 65 - 100 mg/dL Final    BUN 07/23/2022 9  6 - 20 MG/DL Final    Creatinine 07/23/2022 0.69  0.55 - 1.02 MG/DL Final    BUN/Creatinine ratio 07/23/2022 13  12 - 20   Final    GFR est AA 07/23/2022 >60  >60 ml/min/1.73m2 Final    GFR est non-AA 07/23/2022 >60  >60 ml/min/1.73m2 Final    Calcium 07/23/2022 9.4  8.5 - 10.1 MG/DL Final    Bilirubin, total 07/23/2022 0.3  0.2 - 1.0 MG/DL Final    ALT (SGPT) 07/23/2022 15  12 - 78 U/L Final    AST (SGOT) 07/23/2022 19  15 - 37 U/L Final    Alk. phosphatase 07/23/2022 112  45 - 117 U/L Final    Protein, total 07/23/2022 7.9  6.4 - 8.2 g/dL Final    Albumin 07/23/2022 3.3 (A) 3.5 - 5.0 g/dL Final    Globulin 07/23/2022 4.6 (A) 2.0 - 4.0 g/dL Final    A-G Ratio 07/23/2022 0.7 (A) 1.1 - 2.2   Final    Magnesium 07/23/2022 1.8  1.6 - 2.4 mg/dL Final    Salicylate level 39/81/3982 <1.7 (A) 2.8 - 20.0 MG/DL Final    Acetaminophen level 07/23/2022 6 (A) 10 - 30 ug/mL Final    ALCOHOL(ETHYL),SERUM 07/23/2022 <10  <10 MG/DL Final    AMPHETAMINES 07/24/2022 Negative  NEG   Final    BARBITURATES 07/24/2022 Positive (A) NEG   Final    BENZODIAZEPINES 07/24/2022 Positive (A) NEG   Final    COCAINE 07/24/2022 Negative  NEG   Final    METHADONE 07/24/2022 Negative  NEG   Final    OPIATES 07/24/2022 Negative  NEG   Final    PCP(PHENCYCLIDINE) 07/24/2022 Negative  NEG   Final    THC (TH-CANNABINOL) 07/24/2022 Positive (A) NEG   Final    Drug screen comment 07/24/2022 (NOTE)   Final    Urine culture hold 07/24/2022 Urine on hold in Microbiology dept for 2 days. If unpreserved urine is submitted, it cannot be used for addtional testing after 24 hours, recollection will be required. Final    SARS-CoV-2 by PCR 07/24/2022 Not detected  NOTD   Final    Influenza A by PCR 07/24/2022 Not detected  NOTD   Final    Influenza B by PCR 07/24/2022 Not detected  NOTD   Final     No results found. DISPOSITION:    Home. Patient to f/u with drug/etoh rehabilitation, psychiatric, and psychotherapy appointments. FOLLOW-UP CARE:    Activity as tolerated  Regular diet  Wound Care: none needed.   Follow-up Information       Follow up With Specialties Details Why Contact Info    Dr. Rayne Cabrera an appointment as soon as possible for a visit in 3 day(s) Call to schedule appointment as soon as possible (patient declined appointment arrangement through case management services), go to for medication management [address] [facility],[address] [place] [address]  [phone]    Papi Beth  Call  to assist with sobriety and help manage appointments and transportation [phone]    [address] in 2 day(s) Go to for therapy services, connection to services, and case management services [facility], [address] [place] [address]    P: [phone]   F:    None    None ([phone]) Patient stated that they have no PCP                     PROGNOSIS:   Fair ---- based on nature of patient's pathology/ies and treatment compliance issues. Prognosis is greatly dependent upon patient's ability to remain sober and to follow up with scheduled appointments as well as to comply with psychiatric medications as prescribed. DISCHARGE MEDICATIONS:     Informed consent given for the use of following psychotropic medications:  Discharge Medication List as of 7/26/2022  1:08 PM        START taking these medications    Details   hydrOXYzine HCL (ATARAX) 25 mg tablet Take 1 Tablet by mouth two (2) times daily as needed for Anxiety for up to 60 days. Indications: anxious, Normal, Disp-60 Tablet, R-1      melatonin 10 mg tab Take 10.5 mg by mouth nightly as needed for Insomnia. Indications: Insomnia, Normal, Disp-30 Tablet, R-1      ondansetron (ZOFRAN ODT) 4 mg disintegrating tablet Take 1 Tablet by mouth two (2) times daily as needed for Nausea or Vomiting. Indications: Nausea or vomiting, Normal, Disp-60 Tablet, R-1      topiramate (TOPAMAX) 25 mg tablet Take 1 Tablet by mouth two (2) times daily (with meals). Indications: cocaine abuse, Normal, Disp-60 Tablet, R-1           CONTINUE these medications which have CHANGED    Details   atorvastatin (LIPITOR) 20 mg tablet Take 1 Tablet by mouth in the morning. Indications: high amount of triglyceride in the blood, Normal, Disp-30 Tablet, R-1      sodium bicarbonate 650 mg tablet Take 2 Tablets by mouth two (2) times a day. Indications: Hyponatremia, Normal, Disp-120 Tablet, R-1           CONTINUE these medications which have NOT CHANGED    Details   valsartan (DIOVAN) 160 mg tablet Take 1 Tablet by mouth in the morning., Historical Med      umeclidinium-vilanteroL (Anoro Ellipta) 62.5-25 mcg/actuation inhaler Take 1 Puff by inhalation daily. , Historical Med      carisoprodoL (SOMA) 350 mg tablet Take 1 Tablet by mouth two (2) times daily as needed., Historical Med      albuterol (PROVENTIL HFA, VENTOLIN HFA, PROAIR HFA) 90 mcg/actuation inhaler Take 2 Puffs by inhalation every four (4) hours as needed for Wheezing or Shortness of Breath., Historical Med      lidocaine (LIDODERM) 5 % 1 Patch by TransDERmal route daily as needed. 12 hours on then 12 hours off, Historical Med           STOP taking these medications       clopidogreL (PLAVIX) 75 mg tab Comments:   Reason for Stopping:                      A coordinated, multidisplinary treatment team round was conducted with Adolfo Martinez is done daily here at [facility]. This team consists of the nurse, psychiatric unit pharmacist,  and João Pichardo. I have spent greater than 35 minutes on discharge work.     Signed:  Kong Segura MD  7/26/2022